# Patient Record
Sex: MALE | Race: WHITE | Employment: OTHER | URBAN - METROPOLITAN AREA
[De-identification: names, ages, dates, MRNs, and addresses within clinical notes are randomized per-mention and may not be internally consistent; named-entity substitution may affect disease eponyms.]

---

## 2017-10-27 ENCOUNTER — HOSPITAL ENCOUNTER (OUTPATIENT)
Dept: PREADMISSION TESTING | Age: 42
Discharge: HOME | End: 2017-10-27
Payer: OTHER GOVERNMENT

## 2017-10-27 VITALS
DIASTOLIC BLOOD PRESSURE: 87 MMHG | SYSTOLIC BLOOD PRESSURE: 146 MMHG | WEIGHT: 204.38 LBS | HEART RATE: 69 BPM | HEIGHT: 73 IN | TEMPERATURE: 97.5 F | BODY MASS INDEX: 27.09 KG/M2

## 2017-10-27 LAB
ABO + RH BLD: NORMAL
BLOOD GROUP ANTIBODIES SERPL: NORMAL
SPECIMEN EXP DATE BLD: NORMAL

## 2017-10-27 PROCEDURE — 86900 BLOOD TYPING SEROLOGIC ABO: CPT | Performed by: ORTHOPAEDIC SURGERY

## 2017-10-27 PROCEDURE — 36415 COLL VENOUS BLD VENIPUNCTURE: CPT | Performed by: ORTHOPAEDIC SURGERY

## 2017-10-27 RX ORDER — TROLAMINE SALICYLATE 10 G/100G
CREAM TOPICAL AS NEEDED
COMMUNITY

## 2017-10-27 RX ORDER — DICLOFENAC SODIUM 10 MG/G
GEL TOPICAL
COMMUNITY

## 2017-10-27 RX ORDER — ATORVASTATIN CALCIUM 20 MG/1
20 TABLET, FILM COATED ORAL
COMMUNITY

## 2017-10-27 RX ORDER — HYDROXYZINE 25 MG/1
25-50 TABLET, FILM COATED ORAL
COMMUNITY

## 2017-10-27 RX ORDER — HYDROCHLOROTHIAZIDE 12.5 MG/1
12.5 CAPSULE ORAL DAILY
COMMUNITY

## 2017-10-27 RX ORDER — LIDOCAINE 50 MG/G
PATCH TOPICAL EVERY 24 HOURS
COMMUNITY

## 2017-10-27 RX ORDER — SILDENAFIL 50 MG/1
50 TABLET, FILM COATED ORAL AS NEEDED
COMMUNITY

## 2017-10-27 RX ORDER — SERTRALINE HYDROCHLORIDE 100 MG/1
100 TABLET, FILM COATED ORAL
COMMUNITY

## 2017-10-28 LAB
BACTERIA SPEC CULT: NORMAL
BACTERIA SPEC CULT: NORMAL
SERVICE CMNT-IMP: NORMAL

## 2017-10-30 NOTE — H&P
Gallo Godwin  Location: [facility] OFFICE  Patient #: [id number]  : 1975   / Language: English / Race: White  Male      History of Present Illness  The patient is a 42year old male who presents with chronic lumbar back pain. Symptoms include pain, stiffness and decreased range of motion. Symptoms are located in the low back, in the left low back and on the left side more than the right. The pain radiates to the left lower leg and left foot. The patient describes the pain as sharp, dull, aching, burning and throbbing (and stabbing). The symptoms occur constantly. The patient describes symptoms as moderate in severity and worsening. Symptoms are exacerbated by weight bearing, back motion, standing, sitting, lifting, bending, straining and supine position. Symptoms are relieved by rest, ice and heat (and elevation). Associated symptoms include foot numbness and leg weakness. Current treatment includes activity modification and nonsteroidal anti-inflammatory drugs. Recently the disease has been worsening. The patient was previously evaluated by a primary physician. Past evaluation has included x-ray of the lumbar spine and MRI of the lumbar spine. Past treatment has included epidural injection (Toradol injection). Allergies  Erythromycins    Penicillins    Benadryl *ANTIHISTAMINES*      Family History  Arthritis   Mother. Cancer   Father, Mother. Cerebrovascular Accident   Mother. Depression   Brother, Mother. Diabetes Mellitus   Father. Heart disease in female family member before age 72    Hypercholesterolemia   Mother. Hypertension   Brother, Father, Mother. Social History   Alcohol use   1-2 drinks per occasion, 3 times, Drinks beer, Drinks hard liquor, Drinks wine, per month, Rarely drinks more than 5 drinks per occasion. Caffeine use   3-4 drinks per day, Coffee, Tea. Current work status   Full-time.   Exercise   1-2 times per week, cycling, Other, running, Swimming, walking. Marital status   . No drug use    Seat Belt Use   Always uses seat belts. Sun Exposure   Frequently. Tobacco / smoke exposure   None. Tobacco use   Former smoker, Smokes . 75 pack of cigarettes per day. Medication History   Ibuprofen  (Oral) Specific strength unknown - Active. Trolamine  Specific strength unknown - Active. Viagra  (Oral) Specific strength unknown - Active. Lidocaine  (External) Specific strength unknown - Active. Voltaren  (1% Gel, Transdermal) Active. Zoloft  (Oral) Specific strength unknown - Active. HydrOXYzine HCl  (Oral) Specific strength unknown - Active. Medications Reconciled     Past Surgical History  Arthroscopy of Shoulder   bilateral  Other Joint Surgery    Rotator Cuff Surgery   bilateral  Shoulder Surgery   bilateral    Diagnostic Studies History  Lumbar Spine X-ray   Date: 10/13/2017, Results:, Within Normal Limits  MRI, Spine   Date: 9/20/2017, Results: Review of the MRI shows he has degeneration at L5-S1. He also has a left-sided disc herniation at L5-S1. It is causing some lateral recess stenosis and impingement of the exiting S1 nerve root. Other Problems  Allergic Urticaria    Anxiety Disorder    Depression    High blood pressure    Hypercholesterolemia    Psychiatric disorder      Review of Systems   General Not Present- Appetite Loss, Chills, Fatigue, Fever, HIV Exposure, Night Sweats, Persistent Infections, Seasonal Allergies, Weight Gain and Weight Loss. Skin Not Present- Itching, Nail Changes, Poor Wound Healing, Rash, Skin Color Changes, Suspicious Lesions and Yellowish Skin Color. HEENT Present- Ringing in the Ears. Not Present- Decreased Hearing, Double Vision, Earache, Hoarseness, Jaundice/Yellow Eyes, Loose Teeth, Nose Bleed and Sore Throat. Respiratory Present- Snoring. Not Present- Bloody sputum, Chronic Cough, Difficulty Breathing, Wakes up from Sleep Wheezing or Short of Breath and Wheezing.   Cardiovascular Not Present- Bluish Discoloration Of Lips Or Nails, Chest Pain, Difficulty Breathing Lying Down, Difficulty Breathing On Exertion, Leg Cramps With Exertion, Palpitations and Swelling of Extremities. Gastrointestinal Not Present- Abdominal Pain, Black, Tarry Stool, Change in Bowel Habits, Cirrhosis, Constipation, Diarrhea, Difficulty Swallowing, Nausea and Vomiting. Male Genitourinary Not Present- Blood in Urine, Frequency, Painful Urination, Pelvic Pain, Trouble Starting Urinary Stream and Urgency. Musculoskeletal Present- Back Pain, Joint Pain, Joint Stiffness and Joint Swelling. Neurological Present- Headaches and Tingling. Not Present- Fainting, Memory Loss, Numbness, Seizures, Tremor, Unsteadiness and Weakness. Psychiatric Present- Anxiety and Depression. Not Present- Bipolar. Endocrine Not Present- Cold Intolerance, Excessive Hunger, Excessive Thirst, Excessive Urination and Heat Intolerance. Hematology Not Present- Abnormal Bruising , Enlarged Lymph Nodes, Excessive bleeding and Skin Discoloration. Vitals  Weight: 210 lb   Height: 79 in   Body Surface Area: 2.33 m²   Body Mass Index: 23.66 kg/m²      Physical Exam  Neurologic  Sensory  Light Touch - Decreased - Left S1. Intact - Globally. Overall Assessment of Muscle Strength and Tone reveals  Lower Extremities - Right Iliopsoas - 5/5. Left Iliopsoas - 5/5. Right Tibialis Anterior - 5/5. Left Tibialis Anterior - 5/5. Right Gastroc-Soleus - 5/5. Left Gastroc-Soleus - 4-/5. Right EHL - 5/5. Left EHL - 5/5. General Assessment of Reflexes  Right Ankle - Clonus is not present. Left Ankle - Clonus is not present. Reflexes (Dermatomes)  2/2 Normal - Left Achilles (L5-S2), Left Knee (L2-4), Right Achilles (L5-S2) and Right Knee (L2-4). Musculoskeletal  Global Assessment  Examination of related systems reveals - well-developed, well-nourished, in no acute distress, alert and oriented x 3. Gait and Station - normal gait and station and normal posture.  Right Lower Extremity - normal strength and tone, normal range of motion without pain and no instability, subluxation or laxity. Left Lower Extremity - normal strength and tone, normal range of motion without pain and no instability, subluxation or laxity. Spine/Ribs/Pelvis  Cervical Spine - Examination of the cervical spine reveals - no tenderness to palpation, no pain, no swelling, edema or erythema, normal cervical spine movements and normal sensation. Thoracic (Dorsal) Spine - Examination of the thoracic spine reveals - no tenderness over thoracic vertebrae, no pain, normal sensation and normal thoracic spine movements. Lumbosacral Spine - Examination of the lumbosacral spine reveals - no known fractures or deformities. Inspection and Palpation - Tenderness - moderate. Assessment of pain reveals the following findings - The pain is characterized as - moderate. Location - pain refers to lower back bilaterally. ROJM - Trunk Extension - 15 degrees. Lumbar Spine Flexion - . Lumbosacral Spine - Functional Testing - Babinski Test negative, Prone Knee Bending Test negative, Slump Test negative, Straight Leg Raising Test negative. Assessment & Plan  HNP (herniated nucleus pulposus), lumbar (722.10  M51.26)  Impression: He has ongoing low back pain and left leg symptoms. Left leg symptoms are significantly worse recently. I think a candidate for limited lumbar laminectomy L5-S1 with discectomy. The risks and benefits were discussed at length with the patient and the patient has elected to proceed. Indications for surgery include failed conservative treatment. Alternative treatments, risks and the perioperative course were discussed with the patient. All questions were answered. The risks and benefits of the procedure were explained. Benefits include definitive diagnosis, relief of pain, elimination of deformity and improved function.  Risks of surgery including bleeding, infection, weakness, numbness, CSF leak, failure to improve symptoms, exacerbation of medical co-morbidities and even death were discussed with the patient. Current Plans  Pt Education - How to access health information online: discussed with patient and provided information. Pt Education - Educational materials were provided.: discussed with patient and provided information. X-RAY EXAM OF LUMBAR SPINE min 4 VIEWS (17649)  Disc degeneration of lumbar region (722.52  M51.36)  Chronic bilateral low back pain with left-sided sciatica (724.2  M54.42)  Treatment options were discussed with the patient in full.(V65.49)  Current Plans  Pt Education - How to access health information online: discussed with patient and provided information. Presurgical planning was preformed with the patient today  Surgery to be scheduled  Discussed the importance of a well-balanced healthy diet and regular exercise.

## 2017-11-07 ENCOUNTER — APPOINTMENT (OUTPATIENT)
Dept: GENERAL RADIOLOGY | Age: 42
End: 2017-11-07
Attending: ORTHOPAEDIC SURGERY
Payer: OTHER GOVERNMENT

## 2017-11-07 ENCOUNTER — ANESTHESIA EVENT (OUTPATIENT)
Dept: SURGERY | Age: 42
End: 2017-11-07
Payer: OTHER GOVERNMENT

## 2017-11-07 ENCOUNTER — HOSPITAL ENCOUNTER (OUTPATIENT)
Age: 42
Setting detail: OBSERVATION
Discharge: HOME | End: 2017-11-08
Attending: ORTHOPAEDIC SURGERY | Admitting: ORTHOPAEDIC SURGERY
Payer: OTHER GOVERNMENT

## 2017-11-07 ENCOUNTER — ANESTHESIA (OUTPATIENT)
Dept: SURGERY | Age: 42
End: 2017-11-07
Payer: OTHER GOVERNMENT

## 2017-11-07 PROBLEM — M48.061 LUMBAR STENOSIS: Status: ACTIVE | Noted: 2017-11-07

## 2017-11-07 LAB
GLUCOSE BLD STRIP.AUTO-MCNC: 100 MG/DL (ref 65–100)
SERVICE CMNT-IMP: NORMAL

## 2017-11-07 PROCEDURE — 74011000272 HC RX REV CODE- 272: Performed by: ORTHOPAEDIC SURGERY

## 2017-11-07 PROCEDURE — 74011000250 HC RX REV CODE- 250: Performed by: ORTHOPAEDIC SURGERY

## 2017-11-07 PROCEDURE — 99218 HC RM OBSERVATION: CPT

## 2017-11-07 PROCEDURE — 74011250636 HC RX REV CODE- 250/636: Performed by: PHYSICIAN ASSISTANT

## 2017-11-07 PROCEDURE — 77030029099 HC BN WAX SSPC -A: Performed by: ORTHOPAEDIC SURGERY

## 2017-11-07 PROCEDURE — 77030019908 HC STETH ESOPH SIMS -A: Performed by: NURSE ANESTHETIST, CERTIFIED REGISTERED

## 2017-11-07 PROCEDURE — 77030012893

## 2017-11-07 PROCEDURE — 77030026438 HC STYL ET INTUB CARD -A: Performed by: NURSE ANESTHETIST, CERTIFIED REGISTERED

## 2017-11-07 PROCEDURE — 77030013079 HC BLNKT BAIR HGGR 3M -A: Performed by: NURSE ANESTHETIST, CERTIFIED REGISTERED

## 2017-11-07 PROCEDURE — 77030034479 HC ADH SKN CLSR PRINEO J&J -B: Performed by: ORTHOPAEDIC SURGERY

## 2017-11-07 PROCEDURE — 76010000161 HC OR TIME 1 TO 1.5 HR INTENSV-TIER 1: Performed by: ORTHOPAEDIC SURGERY

## 2017-11-07 PROCEDURE — 77030008684 HC TU ET CUF COVD -B: Performed by: NURSE ANESTHETIST, CERTIFIED REGISTERED

## 2017-11-07 PROCEDURE — 74011250636 HC RX REV CODE- 250/636

## 2017-11-07 PROCEDURE — 77030032490 HC SLV COMPR SCD KNE COVD -B: Performed by: ORTHOPAEDIC SURGERY

## 2017-11-07 PROCEDURE — 77030020782 HC GWN BAIR PAWS FLX 3M -B

## 2017-11-07 PROCEDURE — 82962 GLUCOSE BLOOD TEST: CPT

## 2017-11-07 PROCEDURE — 74011250636 HC RX REV CODE- 250/636: Performed by: ANESTHESIOLOGY

## 2017-11-07 PROCEDURE — 76000 FLUOROSCOPY <1 HR PHYS/QHP: CPT

## 2017-11-07 PROCEDURE — 74011000250 HC RX REV CODE- 250

## 2017-11-07 PROCEDURE — 76210000016 HC OR PH I REC 1 TO 1.5 HR: Performed by: ORTHOPAEDIC SURGERY

## 2017-11-07 PROCEDURE — 74011250637 HC RX REV CODE- 250/637: Performed by: PHYSICIAN ASSISTANT

## 2017-11-07 PROCEDURE — 76060000033 HC ANESTHESIA 1 TO 1.5 HR: Performed by: ORTHOPAEDIC SURGERY

## 2017-11-07 PROCEDURE — 77030031139 HC SUT VCRL2 J&J -A: Performed by: ORTHOPAEDIC SURGERY

## 2017-11-07 RX ORDER — SODIUM CHLORIDE 0.9 % (FLUSH) 0.9 %
5-10 SYRINGE (ML) INJECTION EVERY 8 HOURS
Status: DISCONTINUED | OUTPATIENT
Start: 2017-11-07 | End: 2017-11-07 | Stop reason: HOSPADM

## 2017-11-07 RX ORDER — SODIUM CHLORIDE 0.9 % (FLUSH) 0.9 %
5-10 SYRINGE (ML) INJECTION AS NEEDED
Status: DISCONTINUED | OUTPATIENT
Start: 2017-11-07 | End: 2017-11-07 | Stop reason: HOSPADM

## 2017-11-07 RX ORDER — NALOXONE HYDROCHLORIDE 0.4 MG/ML
0.4 INJECTION, SOLUTION INTRAMUSCULAR; INTRAVENOUS; SUBCUTANEOUS AS NEEDED
Status: DISCONTINUED | OUTPATIENT
Start: 2017-11-07 | End: 2017-11-08 | Stop reason: HOSPADM

## 2017-11-07 RX ORDER — OXYCODONE HYDROCHLORIDE 5 MG/1
5-10 TABLET ORAL
Qty: 80 TAB | Refills: 0 | Status: SHIPPED | OUTPATIENT
Start: 2017-11-07

## 2017-11-07 RX ORDER — HYDROCHLOROTHIAZIDE 25 MG/1
12.5 TABLET ORAL DAILY
Status: DISCONTINUED | OUTPATIENT
Start: 2017-11-08 | End: 2017-11-08 | Stop reason: HOSPADM

## 2017-11-07 RX ORDER — MIDAZOLAM HYDROCHLORIDE 1 MG/ML
1 INJECTION, SOLUTION INTRAMUSCULAR; INTRAVENOUS AS NEEDED
Status: DISCONTINUED | OUTPATIENT
Start: 2017-11-07 | End: 2017-11-07 | Stop reason: HOSPADM

## 2017-11-07 RX ORDER — CYCLOBENZAPRINE HCL 10 MG
10 TABLET ORAL
Status: DISCONTINUED | OUTPATIENT
Start: 2017-11-07 | End: 2017-11-08 | Stop reason: HOSPADM

## 2017-11-07 RX ORDER — ACETAMINOPHEN 500 MG
1000 TABLET ORAL ONCE
Status: COMPLETED | OUTPATIENT
Start: 2017-11-07 | End: 2017-11-07

## 2017-11-07 RX ORDER — SODIUM CHLORIDE 9 MG/ML
125 INJECTION, SOLUTION INTRAVENOUS CONTINUOUS
Status: DISCONTINUED | OUTPATIENT
Start: 2017-11-07 | End: 2017-11-08 | Stop reason: HOSPADM

## 2017-11-07 RX ORDER — KETOROLAC TROMETHAMINE 30 MG/ML
30 INJECTION, SOLUTION INTRAMUSCULAR; INTRAVENOUS EVERY 6 HOURS
Status: COMPLETED | OUTPATIENT
Start: 2017-11-07 | End: 2017-11-08

## 2017-11-07 RX ORDER — VANCOMYCIN 2 GRAM/500 ML IN 0.9 % SODIUM CHLORIDE INTRAVENOUS
2000 ONCE
Status: COMPLETED | OUTPATIENT
Start: 2017-11-07 | End: 2017-11-07

## 2017-11-07 RX ORDER — LIDOCAINE HYDROCHLORIDE 10 MG/ML
0.1 INJECTION, SOLUTION EPIDURAL; INFILTRATION; INTRACAUDAL; PERINEURAL AS NEEDED
Status: DISCONTINUED | OUTPATIENT
Start: 2017-11-07 | End: 2017-11-07 | Stop reason: HOSPADM

## 2017-11-07 RX ORDER — SODIUM CHLORIDE 0.9 % (FLUSH) 0.9 %
5-10 SYRINGE (ML) INJECTION AS NEEDED
Status: DISCONTINUED | OUTPATIENT
Start: 2017-11-07 | End: 2017-11-08 | Stop reason: HOSPADM

## 2017-11-07 RX ORDER — SODIUM CHLORIDE 0.9 % (FLUSH) 0.9 %
5-10 SYRINGE (ML) INJECTION EVERY 8 HOURS
Status: DISCONTINUED | OUTPATIENT
Start: 2017-11-08 | End: 2017-11-08 | Stop reason: HOSPADM

## 2017-11-07 RX ORDER — PROPOFOL 10 MG/ML
INJECTION, EMULSION INTRAVENOUS AS NEEDED
Status: DISCONTINUED | OUTPATIENT
Start: 2017-11-07 | End: 2017-11-07 | Stop reason: HOSPADM

## 2017-11-07 RX ORDER — FENTANYL CITRATE 50 UG/ML
INJECTION, SOLUTION INTRAMUSCULAR; INTRAVENOUS AS NEEDED
Status: DISCONTINUED | OUTPATIENT
Start: 2017-11-07 | End: 2017-11-07 | Stop reason: HOSPADM

## 2017-11-07 RX ORDER — FENTANYL CITRATE 50 UG/ML
25 INJECTION, SOLUTION INTRAMUSCULAR; INTRAVENOUS
Status: DISCONTINUED | OUTPATIENT
Start: 2017-11-07 | End: 2017-11-07 | Stop reason: HOSPADM

## 2017-11-07 RX ORDER — SODIUM CHLORIDE, SODIUM LACTATE, POTASSIUM CHLORIDE, CALCIUM CHLORIDE 600; 310; 30; 20 MG/100ML; MG/100ML; MG/100ML; MG/100ML
125 INJECTION, SOLUTION INTRAVENOUS CONTINUOUS
Status: DISCONTINUED | OUTPATIENT
Start: 2017-11-07 | End: 2017-11-07 | Stop reason: HOSPADM

## 2017-11-07 RX ORDER — DEXAMETHASONE SODIUM PHOSPHATE 4 MG/ML
INJECTION, SOLUTION INTRA-ARTICULAR; INTRALESIONAL; INTRAMUSCULAR; INTRAVENOUS; SOFT TISSUE AS NEEDED
Status: DISCONTINUED | OUTPATIENT
Start: 2017-11-07 | End: 2017-11-07 | Stop reason: HOSPADM

## 2017-11-07 RX ORDER — FENTANYL CITRATE 50 UG/ML
50 INJECTION, SOLUTION INTRAMUSCULAR; INTRAVENOUS AS NEEDED
Status: DISCONTINUED | OUTPATIENT
Start: 2017-11-07 | End: 2017-11-07 | Stop reason: HOSPADM

## 2017-11-07 RX ORDER — POLYETHYLENE GLYCOL 3350 17 G/17G
17 POWDER, FOR SOLUTION ORAL DAILY
Status: DISCONTINUED | OUTPATIENT
Start: 2017-11-08 | End: 2017-11-08 | Stop reason: HOSPADM

## 2017-11-07 RX ORDER — THERA TABS 400 MCG
1 TAB ORAL DAILY
Status: DISCONTINUED | OUTPATIENT
Start: 2017-11-08 | End: 2017-11-08 | Stop reason: HOSPADM

## 2017-11-07 RX ORDER — AMOXICILLIN 250 MG
1 CAPSULE ORAL 2 TIMES DAILY
Status: DISCONTINUED | OUTPATIENT
Start: 2017-11-07 | End: 2017-11-08 | Stop reason: HOSPADM

## 2017-11-07 RX ORDER — ACETAMINOPHEN 325 MG/1
650 TABLET ORAL EVERY 6 HOURS
Status: DISCONTINUED | OUTPATIENT
Start: 2017-11-07 | End: 2017-11-08 | Stop reason: HOSPADM

## 2017-11-07 RX ORDER — SERTRALINE HYDROCHLORIDE 50 MG/1
100 TABLET, FILM COATED ORAL
Status: DISCONTINUED | OUTPATIENT
Start: 2017-11-07 | End: 2017-11-08 | Stop reason: HOSPADM

## 2017-11-07 RX ORDER — ROCURONIUM BROMIDE 10 MG/ML
INJECTION, SOLUTION INTRAVENOUS AS NEEDED
Status: DISCONTINUED | OUTPATIENT
Start: 2017-11-07 | End: 2017-11-07 | Stop reason: HOSPADM

## 2017-11-07 RX ORDER — ATORVASTATIN CALCIUM 20 MG/1
20 TABLET, FILM COATED ORAL
Status: DISCONTINUED | OUTPATIENT
Start: 2017-11-07 | End: 2017-11-08 | Stop reason: HOSPADM

## 2017-11-07 RX ORDER — DIPHENHYDRAMINE HYDROCHLORIDE 50 MG/ML
12.5 INJECTION, SOLUTION INTRAMUSCULAR; INTRAVENOUS
Status: DISCONTINUED | OUTPATIENT
Start: 2017-11-07 | End: 2017-11-07

## 2017-11-07 RX ORDER — DIPHENHYDRAMINE HYDROCHLORIDE 50 MG/ML
12.5 INJECTION, SOLUTION INTRAMUSCULAR; INTRAVENOUS AS NEEDED
Status: DISCONTINUED | OUTPATIENT
Start: 2017-11-07 | End: 2017-11-07 | Stop reason: HOSPADM

## 2017-11-07 RX ORDER — ONDANSETRON 2 MG/ML
INJECTION INTRAMUSCULAR; INTRAVENOUS AS NEEDED
Status: DISCONTINUED | OUTPATIENT
Start: 2017-11-07 | End: 2017-11-07 | Stop reason: HOSPADM

## 2017-11-07 RX ORDER — OXYCODONE HYDROCHLORIDE 5 MG/1
5 TABLET ORAL
Status: DISCONTINUED | OUTPATIENT
Start: 2017-11-07 | End: 2017-11-08 | Stop reason: HOSPADM

## 2017-11-07 RX ORDER — MIDAZOLAM HYDROCHLORIDE 1 MG/ML
0.5 INJECTION, SOLUTION INTRAMUSCULAR; INTRAVENOUS
Status: DISCONTINUED | OUTPATIENT
Start: 2017-11-07 | End: 2017-11-07 | Stop reason: HOSPADM

## 2017-11-07 RX ORDER — NEOSTIGMINE METHYLSULFATE 1 MG/ML
INJECTION INTRAVENOUS AS NEEDED
Status: DISCONTINUED | OUTPATIENT
Start: 2017-11-07 | End: 2017-11-07 | Stop reason: HOSPADM

## 2017-11-07 RX ORDER — LIDOCAINE HYDROCHLORIDE 20 MG/ML
INJECTION, SOLUTION EPIDURAL; INFILTRATION; INTRACAUDAL; PERINEURAL AS NEEDED
Status: DISCONTINUED | OUTPATIENT
Start: 2017-11-07 | End: 2017-11-07 | Stop reason: HOSPADM

## 2017-11-07 RX ORDER — SODIUM CHLORIDE 9 MG/ML
25 INJECTION, SOLUTION INTRAVENOUS CONTINUOUS
Status: DISCONTINUED | OUTPATIENT
Start: 2017-11-07 | End: 2017-11-07 | Stop reason: HOSPADM

## 2017-11-07 RX ORDER — MORPHINE SULFATE 10 MG/ML
2 INJECTION, SOLUTION INTRAMUSCULAR; INTRAVENOUS
Status: DISCONTINUED | OUTPATIENT
Start: 2017-11-07 | End: 2017-11-07 | Stop reason: HOSPADM

## 2017-11-07 RX ORDER — OXYCODONE HYDROCHLORIDE 5 MG/1
10 TABLET ORAL
Status: DISCONTINUED | OUTPATIENT
Start: 2017-11-07 | End: 2017-11-08 | Stop reason: HOSPADM

## 2017-11-07 RX ORDER — HYDROMORPHONE HYDROCHLORIDE 1 MG/ML
INJECTION, SOLUTION INTRAMUSCULAR; INTRAVENOUS; SUBCUTANEOUS AS NEEDED
Status: DISCONTINUED | OUTPATIENT
Start: 2017-11-07 | End: 2017-11-07 | Stop reason: HOSPADM

## 2017-11-07 RX ORDER — HYDROXYZINE 25 MG/1
25-50 TABLET, FILM COATED ORAL
Status: DISCONTINUED | OUTPATIENT
Start: 2017-11-07 | End: 2017-11-08 | Stop reason: HOSPADM

## 2017-11-07 RX ORDER — OXYCODONE AND ACETAMINOPHEN 5; 325 MG/1; MG/1
1 TABLET ORAL AS NEEDED
Status: DISCONTINUED | OUTPATIENT
Start: 2017-11-07 | End: 2017-11-07 | Stop reason: HOSPADM

## 2017-11-07 RX ORDER — VANCOMYCIN 1.75 GRAM/500 ML IN 0.9 % SODIUM CHLORIDE INTRAVENOUS
1750 ONCE
Status: COMPLETED | OUTPATIENT
Start: 2017-11-08 | End: 2017-11-08

## 2017-11-07 RX ORDER — BUPIVACAINE HYDROCHLORIDE AND EPINEPHRINE 2.5; 5 MG/ML; UG/ML
INJECTION, SOLUTION EPIDURAL; INFILTRATION; INTRACAUDAL; PERINEURAL AS NEEDED
Status: DISCONTINUED | OUTPATIENT
Start: 2017-11-07 | End: 2017-11-07 | Stop reason: HOSPADM

## 2017-11-07 RX ORDER — MORPHINE SULFATE 10 MG/ML
2 INJECTION, SOLUTION INTRAMUSCULAR; INTRAVENOUS
Status: DISCONTINUED | OUTPATIENT
Start: 2017-11-07 | End: 2017-11-08 | Stop reason: HOSPADM

## 2017-11-07 RX ORDER — MIDAZOLAM HYDROCHLORIDE 1 MG/ML
INJECTION, SOLUTION INTRAMUSCULAR; INTRAVENOUS AS NEEDED
Status: DISCONTINUED | OUTPATIENT
Start: 2017-11-07 | End: 2017-11-07 | Stop reason: HOSPADM

## 2017-11-07 RX ORDER — HYDROMORPHONE HYDROCHLORIDE 1 MG/ML
0.2 INJECTION, SOLUTION INTRAMUSCULAR; INTRAVENOUS; SUBCUTANEOUS
Status: DISCONTINUED | OUTPATIENT
Start: 2017-11-07 | End: 2017-11-07 | Stop reason: HOSPADM

## 2017-11-07 RX ORDER — FACIAL-BODY WIPES
10 EACH TOPICAL DAILY PRN
Status: DISCONTINUED | OUTPATIENT
Start: 2017-11-09 | End: 2017-11-08 | Stop reason: HOSPADM

## 2017-11-07 RX ORDER — ONDANSETRON 2 MG/ML
4 INJECTION INTRAMUSCULAR; INTRAVENOUS
Status: DISCONTINUED | OUTPATIENT
Start: 2017-11-07 | End: 2017-11-08 | Stop reason: HOSPADM

## 2017-11-07 RX ORDER — GLYCOPYRROLATE 0.2 MG/ML
INJECTION INTRAMUSCULAR; INTRAVENOUS AS NEEDED
Status: DISCONTINUED | OUTPATIENT
Start: 2017-11-07 | End: 2017-11-07 | Stop reason: HOSPADM

## 2017-11-07 RX ORDER — ONDANSETRON 2 MG/ML
4 INJECTION INTRAMUSCULAR; INTRAVENOUS AS NEEDED
Status: DISCONTINUED | OUTPATIENT
Start: 2017-11-07 | End: 2017-11-07 | Stop reason: HOSPADM

## 2017-11-07 RX ADMIN — KETOROLAC TROMETHAMINE 30 MG: 30 INJECTION, SOLUTION INTRAMUSCULAR at 23:33

## 2017-11-07 RX ADMIN — VANCOMYCIN HYDROCHLORIDE 2000 MG: 10 INJECTION, POWDER, LYOPHILIZED, FOR SOLUTION INTRAVENOUS at 11:40

## 2017-11-07 RX ADMIN — SODIUM CHLORIDE, POTASSIUM CHLORIDE, SODIUM LACTATE AND CALCIUM CHLORIDE: 600; 310; 30; 20 INJECTION, SOLUTION INTRAVENOUS at 13:33

## 2017-11-07 RX ADMIN — SODIUM CHLORIDE, POTASSIUM CHLORIDE, SODIUM LACTATE AND CALCIUM CHLORIDE: 600; 310; 30; 20 INJECTION, SOLUTION INTRAVENOUS at 12:20

## 2017-11-07 RX ADMIN — NEOSTIGMINE METHYLSULFATE 2 MG: 1 INJECTION INTRAVENOUS at 13:31

## 2017-11-07 RX ADMIN — ACETAMINOPHEN 1000 MG: 500 TABLET, FILM COATED ORAL at 11:19

## 2017-11-07 RX ADMIN — HYDROXYZINE HYDROCHLORIDE 25 MG: 25 TABLET ORAL at 21:33

## 2017-11-07 RX ADMIN — SODIUM CHLORIDE 125 ML/HR: 900 INJECTION, SOLUTION INTRAVENOUS at 16:06

## 2017-11-07 RX ADMIN — MIDAZOLAM HYDROCHLORIDE 0.5 MG: 1 INJECTION, SOLUTION INTRAMUSCULAR; INTRAVENOUS at 14:43

## 2017-11-07 RX ADMIN — MIDAZOLAM HYDROCHLORIDE 3 MG: 1 INJECTION, SOLUTION INTRAMUSCULAR; INTRAVENOUS at 12:25

## 2017-11-07 RX ADMIN — VANCOMYCIN HYDROCHLORIDE 1750 MG: 10 INJECTION, POWDER, LYOPHILIZED, FOR SOLUTION INTRAVENOUS at 23:34

## 2017-11-07 RX ADMIN — SERTRALINE HYDROCHLORIDE 100 MG: 50 TABLET ORAL at 21:33

## 2017-11-07 RX ADMIN — FENTANYL CITRATE 50 MCG: 50 INJECTION, SOLUTION INTRAMUSCULAR; INTRAVENOUS at 12:53

## 2017-11-07 RX ADMIN — MIDAZOLAM HYDROCHLORIDE 2 MG: 1 INJECTION, SOLUTION INTRAMUSCULAR; INTRAVENOUS at 12:32

## 2017-11-07 RX ADMIN — ROCURONIUM BROMIDE 40 MG: 10 INJECTION, SOLUTION INTRAVENOUS at 12:34

## 2017-11-07 RX ADMIN — KETOROLAC TROMETHAMINE 30 MG: 30 INJECTION, SOLUTION INTRAMUSCULAR at 18:06

## 2017-11-07 RX ADMIN — GLYCOPYRROLATE 0.4 MG: 0.2 INJECTION INTRAMUSCULAR; INTRAVENOUS at 13:31

## 2017-11-07 RX ADMIN — LIDOCAINE HYDROCHLORIDE 100 MG: 20 INJECTION, SOLUTION EPIDURAL; INFILTRATION; INTRACAUDAL; PERINEURAL at 12:34

## 2017-11-07 RX ADMIN — FENTANYL CITRATE 25 MCG: 50 INJECTION, SOLUTION INTRAMUSCULAR; INTRAVENOUS at 14:22

## 2017-11-07 RX ADMIN — FENTANYL CITRATE 25 MCG: 50 INJECTION, SOLUTION INTRAMUSCULAR; INTRAVENOUS at 14:30

## 2017-11-07 RX ADMIN — PROPOFOL 200 MG: 10 INJECTION, EMULSION INTRAVENOUS at 12:34

## 2017-11-07 RX ADMIN — ATORVASTATIN CALCIUM 20 MG: 20 TABLET, FILM COATED ORAL at 21:33

## 2017-11-07 RX ADMIN — MIDAZOLAM HYDROCHLORIDE 0.5 MG: 1 INJECTION, SOLUTION INTRAMUSCULAR; INTRAVENOUS at 14:48

## 2017-11-07 RX ADMIN — DEXAMETHASONE SODIUM PHOSPHATE 8 MG: 4 INJECTION, SOLUTION INTRA-ARTICULAR; INTRALESIONAL; INTRAMUSCULAR; INTRAVENOUS; SOFT TISSUE at 12:51

## 2017-11-07 RX ADMIN — FENTANYL CITRATE 50 MCG: 50 INJECTION, SOLUTION INTRAMUSCULAR; INTRAVENOUS at 12:56

## 2017-11-07 RX ADMIN — ONDANSETRON 4 MG: 2 INJECTION INTRAMUSCULAR; INTRAVENOUS at 13:28

## 2017-11-07 RX ADMIN — HYDROMORPHONE HYDROCHLORIDE 0.5 MG: 1 INJECTION, SOLUTION INTRAMUSCULAR; INTRAVENOUS; SUBCUTANEOUS at 13:42

## 2017-11-07 RX ADMIN — ACETAMINOPHEN 650 MG: 325 TABLET ORAL at 23:33

## 2017-11-07 RX ADMIN — FENTANYL CITRATE 100 MCG: 50 INJECTION, SOLUTION INTRAMUSCULAR; INTRAVENOUS at 12:34

## 2017-11-07 RX ADMIN — FENTANYL CITRATE 25 MCG: 50 INJECTION, SOLUTION INTRAMUSCULAR; INTRAVENOUS at 14:45

## 2017-11-07 RX ADMIN — HYDROMORPHONE HYDROCHLORIDE 0.5 MG: 1 INJECTION, SOLUTION INTRAMUSCULAR; INTRAVENOUS; SUBCUTANEOUS at 12:43

## 2017-11-07 RX ADMIN — DOCUSATE SODIUM AND SENNOSIDES 1 TABLET: 8.6; 5 TABLET, FILM COATED ORAL at 18:05

## 2017-11-07 RX ADMIN — ACETAMINOPHEN 650 MG: 325 TABLET ORAL at 18:05

## 2017-11-07 NOTE — BRIEF OP NOTE
BRIEF OPERATIVE NOTE    Date of Procedure: 11/7/2017   Preoperative Diagnosis: HNP, DJD  Postoperative Diagnosis: HNP, DJD    Procedure(s):  LIMITED LUMBAR LAMINECTOMY L5-S1 WITH DISCECTOMY  Surgeon(s) and Role:     * Juli Sabillon MD - Primary         Assistant Staff:  Physician Assistant: Jamie Lo PA-C    Surgical Staff:  Circ-1: Harvey Bolanos RN  Circ-Relief: Pamela Shaikh RN  Physician Assistant: Jamie Lo PA-C  Scrub Tech-1: Marga Ruby  Scrub RN-Relief: Yuliya Perez RN  Event Time In   Incision Start 1254   Incision Close 1340     Anesthesia: General   Estimated Blood Loss: see full op note  Specimens: * No specimens in log *   Findings: HNP   Complications: none  Implants: * No implants in log *

## 2017-11-07 NOTE — PERIOP NOTES
1:06 PM  Patient's wife, Maria M Nichols, contacted at this time for a surgical update.    Sandra Smith RN

## 2017-11-07 NOTE — PERIOP NOTES
TRANSFER - OUT REPORT:    Verbal report given to MICHAELLE Mitchell on Ellen Roe  being transferred to [address] [place], [place] [id number] for routine post - op       Report consisted of patients Situation, Background, Assessment and   Recommendations(SBAR). Time Pre op antibiotic given:1140  Anesthesia Stop time: 4610  Nunez Present on Transfer to floor:no  Order for Nunez on Chart:n/a    Information from the following report(s) SBAR, Kardex, OR Summary, Procedure Summary, Intake/Output and MAR was reviewed with the receiving nurse. Opportunity for questions and clarification was provided. Is the patient on 02? YES       L/Min 2       Other     Is the patient on a monitor? NO    Is the nurse transporting with the patient? NO    Surgical Waiting Area notified of patient's transfer from PACU?  YES      The following personal items collected during your admission accompanied patient upon transfer:   Dental Appliance:    Vision:    Hearing Aid:    Jewelry:    Clothing: Clothing: Other (comment) (bag of clothing and eyeglasses returned to pt at bedside)  Other Valuables:    Valuables sent to safe:

## 2017-11-07 NOTE — IP AVS SNAPSHOT
[address] [facility] [address] [place] [address] 
[phone] Patient: Rosa Castorena MRN: [id number] :1975 About your hospitalization You were admitted on:  2017 You last received care in the:  [address] [place] [address] You were discharged on:  2017 Why you were hospitalized Your primary diagnosis was:  Not on File Your diagnoses also included:  Lumbar Stenosis Things You Need To Do (next 8 weeks) Schedule an appointment with Tony Talbot MD as soon as possible for a visit in 2 week(s) Phone:  [phone] Where:  [facility], [address], [address] [place] [address] Follow up with Nhan Chavez MD  
  
Where:  Patient can only remember the practice name and not the physician Discharge Orders None A check zayra indicates which time of day the medication should be taken. My Medications TAKE these medications as instructed Instructions Each Dose to Equal  
 Morning Noon Evening Bedtime  
 atorvastatin 20 mg tablet Commonly known as:  LIPITOR Your last dose was: Your next dose is: Take 20 mg by mouth nightly. 20 mg  
    
   
   
   
  
 diclofenac 1 % Gel Commonly known as:  VOLTAREN Your last dose was: Your next dose is:    
   
   
 Apply  to affected area nightly as needed. hydroCHLOROthiazide 12.5 mg capsule Commonly known as:  Hermelinda Sensor Your last dose was: Your next dose is: Take 12.5 mg by mouth daily. 12.5 mg  
    
   
   
   
  
 hydrOXYzine HCl 25 mg tablet Commonly known as:  ATARAX Your last dose was: Your next dose is: Take 25-50 mg by mouth nightly. 25-50 mg  
    
   
   
   
  
 lidocaine 5 % Commonly known as:  Dufm Fusi Your last dose was: Your next dose is: by TransDERmal route every twenty-four (24) hours. Apply patch to the affected area for 12 hours a day and remove for 12 hours a day. MEN'S MULTIVITAMIN GUMMIES PO Your last dose was: Your next dose is: Take 2 Gum by mouth daily. 2 Gum * oxyCODONE IR 5 mg immediate release tablet Commonly known as:  Dorthea Mccreedy Your last dose was: Your next dose is: Take 1-2 Tabs by mouth every four (4) hours as needed for Pain. Max Daily Amount: 60 mg.  
 5-10 mg  
    
   
   
   
  
 * oxyCODONE IR 5 mg immediate release tablet Commonly known as:  Dorthea Mccreedy Your last dose was: Your next dose is: Take 1-2 Tabs by mouth every four (4) hours as needed. Max Daily Amount: 60 mg.  
 5-10 mg PROBIOTIC 4X 10-15 mg Tbec Generic drug:  B.infantis-B.ani-B.long-B.bifi Your last dose was: Your next dose is: Take 2 Gum by mouth daily. 2 Gum  
    
   
   
   
  
 sertraline 100 mg tablet Commonly known as:  ZOLOFT Your last dose was: Your next dose is: Take 100 mg by mouth nightly. 100 mg  
    
   
   
   
  
 sildenafil citrate 50 mg tablet Commonly known as:  VIAGRA Your last dose was: Your next dose is: Take 50 mg by mouth as needed. 50 mg  
    
   
   
   
  
 trolamine salicylate 10 % topical cream  
Commonly known as:  MYOFLEX Your last dose was: Your next dose is:    
   
   
 Apply  to affected area as needed. * Notice: This list has 2 medication(s) that are the same as other medications prescribed for you. Read the directions carefully, and ask your doctor or other care provider to review them with you. Where to Get Your Medications Information on where to get these meds will be given to you by the nurse or doctor. ! Ask your nurse or doctor about these medications  
  oxyCODONE IR 5 mg immediate release tablet  
 oxyCODONE IR 5 mg immediate release tablet Discharge Instructions [facility] Dr. Isabel Blake [phone] After Hospital Care Plan:  Discharge Instructions Lumbar Laminectomy Surgery Patient Name: Gera Cardenas Date of procedure: 11/7/2017  Date of discharge: 11/8/17 Procedure: Procedure(s): LIMITED LUMBAR LAMINECTOMY L5-S1 WITH DISCECTOMY  PCP: Nhan Chavez MD 
 
Follow up appointments 
-Follow up with Dr. Isabel Blake in 2 weeks. Call [phone] to make an appointment as soon as you get home from the hospital. 
 
[address] [place] [address]: _________________________   phone: ___________________ The agency will contact you to arrange dates/times for visits. Please call them if you do not hear from them within 24 hours after you are discharged Physical therapy 3 times a week for 3 weeks Nursing-initial assessment and as needed When to call your Orthopaedic Surgeon: 
-Signs of infection-if your incision is red; continues to have drainage; drainage has a foul odor or if you have a persistent fever over 101 degrees for 24 hours 
-Nausea or vomiting, severe headache 
-Loss of bowel or bladder function, inability to urinate 
-Changes in sensation in your arms or legs (numbness, tingling, loss of color) -Increased weakness-greater than before your surgery 
-Severe pain or pain not relieved by medications 
-Signs of a blood clot in your leg-calf pain, tenderness, redness, swelling of lower leg When to call your Primary Care Physician: 
-Concerns about medical conditions such as diabetes, high blood pressure, asthma, congestive heart failure 
-Call if blood sugars are elevated, persistent headache or dizziness, coughing or congestion, constipation or diarrhea, burning with urination, abnormal heart rate When to call 911 and go to the nearest emergency room: 
-Acute onset of chest pain, shortness of breath, difficulty breathing Activity - You are going home a well person, be as active as possible. Your only exercise should be walking. Start with short frequent walks and increase your walking distance each day. 
-Limit the amount of time you sit to 20-30 minute intervals. Sitting for prolonged periods of time will be uncomfortable for you following surgery. 
-Do NOT lift anything over 5 pounds 
-Do NOT do any straining, twisting or bending 
-When you are in bed, you may lay on your back or on either side. Do NOT lie on your stomach Brace 
-If you have a back brace, you should wear your brace at all times when you are out of bed. Do not wear the brace while in bed or showering. 
-Remember to always wear a cotton t-shirt underneath your brace. 
-Do not bend or twist when your brace is off Diet 
-Resume usual diet; drink plenty of fluids; eat foods high in fiber 
-It is important to have regular bowel movements. Pain medications may cause constipation. You may want to take a stool softener (such as Senokot-S or Colace) to prevent constipation. 
-If constipation occurs, take a laxative (such as Dulcolax tablets, Milk of Magnesia, or a suppository). Laxatives should only be used if the above preventable measures have failed and you still have not had a bowel movement after three days. Driving 
-You may not drive or return to work until instructed by your physician. However, you may ride in the car for short periods of time. Incision Care Your incision has been closed with absorbable sutures and the Dermabond Prineo skin closure system. This is a combination of a mesh and a liquid adhesive that will assist with healing. The mesh is to remain on your incision for 2 weeks. A dry dressing (ABD and tape) will be placed over it and should be changed daily.  Please make sure to wash your hands prior to touching your dressing. You may take brief showers but do not run the water directly onto the wound. After your shower, blot your incision dry with a soft towel and replace the dry dressing. Do not allow the tape to come in contact with the mesh. Do not rub or apply any lotions or ointments to your incision site. Do not soak or scrub your wound. The mesh dressing will be removed during your two week follow-up appointment. If you experience drainage leaking from underneath the mesh or if it peels off before 2 weeks, please contact your orthopedic surgeons office. Showering 
-You may shower in approximately 4 days after your surgery.   
-Leave the dressing on during your shower. Do NOT allow the water to run directly onto your dressing. Once you get out of the shower, gently pat the dressing dry. -Reminder- your brace can be removed while showering. Remember to not bend or twist while your brace is off.   
-Do not take a tub bath. Preventing blood clots 
-You have been given T.E.D. stockings to wear. Continue to wear these for 7 days after your discharge. Put them on in the morning and take them off at night.   
-They are used to increase your circulation and prevent blood clots from forming in your legs 
-T. E.D. stockings can be machine washed, temperature not to exceed 160° F (71°C) and machine dried for 15 to 20 minutes, temperature not to exceed 250° F (121°C). Pain management 
-Take pain medication as prescribed; decrease the amount you use as your pain lessens 
-Do not wait until you are in extreme pain to take your medication. 
-Avoid alcoholic beverages while taking pain medication Pain Medication Safety DO: 
-Read the Medication Guide  
-Take your medicine exactly as prescribed  
-Store your medicine away from children and in a safe place  
-Flush unused medicine down the toilet -Call your healthcare provider for medical advice about side effects. You may report side effects to FDA at 7-235-FDA-3171.  
-Please be aware that many medications contain Tylenol. We do not want you to over medicate so please read the information below as a guide. Do not take more than 4 Grams of Tylenol in a 24 hour period. (There are 1000 milligrams in one Gram) Percocet contains 325 mg of Tylenol per tablet (do not take more than 12 tablets in 24 hours) Lortab contains 500 mg of Tylenol per tablet (do not take more than 8 tablets in 24 hours) Norco contains 325 mg of Tylenol per tablet (do not take more than 12 tablets in 24 hours). DO NOT: 
-Do not give your medicine to others  
-Do not take medicine unless it was prescribed for you  
-Do not stop taking your medicine without talking to your healthcare provider  
-Do not break, chew, crush, dissolve, or inject your medicine. If you cannot  swallow your medicine whole, talk to your healthcare provider. 
-Do not drink alcohol while taking this medicine 
-Do not take anti-inflammatory medications or aspirin unless instructed by your physician. [employer] Announcement We are excited to announce that we are making your provider's discharge notes available to you in JumpSofthart. You will see these notes when they are completed and signed by the physician that discharged you from your recent hospital stay. If you have any questions or concerns about any information you see in [employer], please call the Health Information Department where you were seen or reach out to your Primary Care Provider for more information about your plan of care. Introducing [facility]! [facility] introduces Rayspan patient portal. Now you can access parts of your medical record, email your doctor's office, and request medication refills online. 1. In your internet browser, go to [email or web address] 2. Click on the First Time User? Click Here link in the Sign In box. You will see the New Member Sign Up page. 3. Enter your Rayspan Access Code exactly as it appears below. You will not need to use this code after youve completed the sign-up process. If you do not sign up before the expiration date, you must request a new code. · Rayspan Access Code: [email or web address] Expires: 1/25/2018  9:59 AM 
 
4. Enter the last four digits of your Social Security Number (xxxx) and Date of Birth (mm/dd/yyyy) as indicated and click Submit. You will be taken to the next sign-up page. 5. Create a Rayspan ID. This will be your Rayspan login ID and cannot be changed, so think of one that is secure and easy to remember. 6. Create a Rayspan password. You can change your password at any time. 7. Enter your Password Reset Question and Answer. This can be used at a later time if you forget your password. 8. Enter your e-mail address. You will receive e-mail notification when new information is available in [address] 9. Click Sign Up. You can now view and download portions of your medical record. 10. Click the Download Summary menu link to download a portable copy of your medical information. If you have questions, please visit the Frequently Asked Questions section of the Rayspan website. Remember, Rayspan is NOT to be used for urgent needs. For medical emergencies, dial 911. Now available from your iPhone and Android! Providers Seen During Your Hospitalization Provider Specialty Primary office phone Aretha Lynch MD Orthopedic Surgery [phone] Your Primary Care Physician (PCP) Primary Care Physician Office Phone Office Fax OTHER, PHYS ** None ** ** None ** You are allergic to the following Allergen Reactions Benadryl (Diphenhydramine Hcl) Hives Erythromycin Hives Penicillins Hives Recent Documentation Height Weight BMI Smoking Status 1.854 m 92.5 kg 26.91 kg/m2 Former Smoker Emergency Contacts Name Discharge Info Relation Home Work Mobile Jeanette Goff 336 CAREGIVER [3] Spouse [3] [phone] Patient Belongings The following personal items are in your possession at time of discharge: 
     Visual Aid: Glasses, With patient             Clothing: Other (comment) (bag of clothing and eyeglasses returned to pt at bedside) Discharge Instructions Attachments/References MEFS - OXYCODONE, RAPID RELEASE (ETH-OXYDOSE, OXY IR, ROXICODONE) - (BY MOUTH) (ENGLISH) Patient Handouts Oxycodone, Rapid Release (ETH-Oxydose, Oxy IR, Roxicodone) - (By mouth) Why this medicine is used:  
Treats moderate to severe pain. This medicine is a narcotic pain reliever. Contact a nurse or doctor right away if you have: 
· Fast or slow heart beat, shallow breathing, blue lips, skin or fingernails · Anxiety, restlessness, fever, sweating, muscle spasms, twitching, seeing or hearing things that are not there · Extreme weakness, shallow breathing, slow heartbeat · Severe confusion, lightheadedness, dizziness, fainting · Sweating or cold, clammy skin, seizures · Severe constipation, stomach pain, nausea, vomiting Common side effects: · Mild constipation · Sleepiness, tiredness © 2017 [facility] Information is for End User's use only and may not be sold, redistributed or otherwise used for commercial purposes. Please provide this summary of care documentation to your next provider.  
  
  
 
  
Signatures-by signing, you are acknowledging that this After Visit Summary has been reviewed with you and you have received a copy. Patient Signature:  ____________________________________________________________ Date:  ____________________________________________________________  
  
Zee Amber Provider Signature:  ____________________________________________________________ Date:  ____________________________________________________________

## 2017-11-07 NOTE — OP NOTES
[address] 63   [address] [facility], [address] [place] [address]   OP NOTE       Name:  Chula Mosqueda   MR#:  [id number]   :  1975   Account #:  [de-identified]    Surgery Date:  2017   Date of Adm:  2017       PREOPERATIVE DIAGNOSIS: Lumbar disk herniation, L5-S1, left   sided, L5-S1 radiculopathy. POSTOPERATIVE DIAGNOSIS: Lumbar disk herniation, L5-S1, left   sided, L5-S1 radiculopathy. PROCEDURES PERFORMED:  Lumbar laminectomy, L5-S1 with   diskectomy. SURGEON: Peace Flores. Fransico Harden MD    ASSISTANT: Rox Ramirez PA-C    ESTIMATED BLOOD LOSS: Minimal.     SPECIMENS REMOVED: None    COMPLICATIONS: None. ANESTHESIA:  General    INDICATIONS: This is a pleasant gentleman with history of ongoing   lower back and left leg symptoms. Left-sided disk protrusion at L5-S1. He had failed conservative treatment, understood the risks and   benefits and elected to proceed. DESCRIPTION OF PROCEDURE: The patient was identified, brought   to the operative suite and underwent general anesthesia without   difficulty. Placed in the prone position on the Parish frame with all bony   prominences well padded. Ancef given to the patient. The back was   prepped and draped sterilely after an appropriate time-out was   performed. An incision was made on the left-hand side approximately at L5-S1. Dissection carried down, exposing the posterior sacrum, posterior L5   segment, at which time we did a laminectomy with undercutting of the   L5 lamina with release of the ligamentum. This was removed in   piecemeal fashion with exposure of the medial dura as well as the   exiting S1 nerve root. Partial laminectomy at the superior portion of S1   for decompression of the S1 nerve root was traced around its   pedicle. We used hemostasis with bipolar cautery, at which time we   then carefully retracted the nerve roots.  The left paracentral disk   protrusion was identified annulotomy performed, and we   did diskectomy for decompression with further decompression of the   exiting S1 nerve root using 3-0 Vicryl. The wounds were irrigated. Interrupted 0 Vicryl in the fascial layer, 2-0   Vicryl subcutaneous layer, and 3-0 Vicryl in the skin.         MD Roberto Ramírez   D:  11/07/2017   13:40   T:  11/07/2017   15:32   Job #:  [id number]

## 2017-11-07 NOTE — ANESTHESIA POSTPROCEDURE EVALUATION
Post-Anesthesia Evaluation and Assessment    Patient: Lyla Barger MRN: [id number]  SSN: [id number]    YOB: 1975  Age: 42 y.o. Sex: male       Cardiovascular Function/Vital Signs  Visit Vitals    BP (!) 151/96    Pulse 83    Temp 36.4 °C (97.6 °F)    Resp 13    Ht 6' 1\" (1.854 m)    Wt 92.5 kg (204 lb)    SpO2 99%    BMI 26.91 kg/m2       Patient is status post general anesthesia for Procedure(s):  LIMITED LUMBAR LAMINECTOMY L5-S1 WITH DISCECTOMY. Nausea/Vomiting: None    Postoperative hydration reviewed and adequate. Pain:  Pain Scale 1: Numeric (0 - 10) (11/07/17 1422)  Pain Intensity 1: 6 (11/07/17 1422)   Managed    Neurological Status:   Neuro (WDL): (P) Exceptions to WDL (11/07/17 1352)   At baseline    Mental Status and Level of Consciousness: Arousable    Pulmonary Status:   O2 Device: (P) Nasal cannula (11/07/17 1400)   Adequate oxygenation and airway patent    Complications related to anesthesia: None    Post-anesthesia assessment completed.  No concerns    Signed By: Evonne Schultz DO     November 7, 2017

## 2017-11-07 NOTE — PERIOP NOTES
1:04 PM  Attempted to contact patient's wife, Eddie Oliva, via surgical waiting area phone and personal cell phone number with no answer at this time.      Bobbi Holland RN

## 2017-11-07 NOTE — PROGRESS NOTES
Problem: Falls - Risk of  Goal: *Absence of Falls  Document Patrizia Fall Risk and appropriate interventions in the flowsheet.   Outcome: Progressing Towards Goal  Fall Risk Interventions:  Mobility Interventions: Patient to call before getting OOB, PT Consult for mobility concerns         Medication Interventions: Patient to call before getting OOB, Teach patient to arise slowly, Utilize gait belt for transfers/ambulation

## 2017-11-07 NOTE — DISCHARGE INSTRUCTIONS
[facility] Dr. Kiah Blanca  [phone]    After [address]:  Discharge Instructions Lumbar Laminectomy Surgery     Patient Name: Shayne Davis    Date of procedure: 11/7/2017  Date of discharge: 11/8/17    Procedure: Procedure(s):  LIMITED LUMBAR LAMINECTOMY L5-S1 WITH DISCECTOMY  PCP: Nhan Chavez MD    Follow up appointments  -Follow up with Dr. Kiah Blanca in 2 weeks. Call [phone] to make an appointment as soon as you get home from the hospital.    [address]: _________________________   phone: ___________________  The agency will contact you to arrange dates/times for visits. Please call them if you do not hear from them within 24 hours after you are discharged  Physical therapy 3 times a week for 3 weeks  Nursing-initial assessment and as needed    When to call your Orthopaedic Surgeon:  -Signs of infection-if your incision is red; continues to have drainage; drainage has a foul odor or if you have a persistent fever over 101 degrees for 24 hours  -Nausea or vomiting, severe headache  -Loss of bowel or bladder function, inability to urinate  -Changes in sensation in your arms or legs (numbness, tingling, loss of color)  -Increased weakness-greater than before your surgery  -Severe pain or pain not relieved by medications  -Signs of a blood clot in your leg-calf pain, tenderness, redness, swelling of lower leg    When to call your Primary Care Physician:  -Concerns about medical conditions such as diabetes, high blood pressure, asthma, congestive heart failure  -Call if blood sugars are elevated, persistent headache or dizziness, coughing or congestion, constipation or diarrhea, burning with urination, abnormal heart rate    When to call 911 and go to the nearest emergency room:  -Acute onset of chest pain, shortness of breath, difficulty breathing    Activity  - You are going home a well person, be as active as possible.   Your only exercise should be walking. Start with short frequent walks and increase your walking distance each day.  -Limit the amount of time you sit to 20-30 minute intervals. Sitting for prolonged periods of time will be uncomfortable for you following surgery.  -Do NOT lift anything over 5 pounds  -Do NOT do any straining, twisting or bending  -When you are in bed, you may lay on your back or on either side. Do NOT lie on your stomach    Brace  -If you have a back brace, you should wear your brace at all times when you are out of bed. Do not wear the brace while in bed or showering.  -Remember to always wear a cotton t-shirt underneath your brace.  -Do not bend or twist when your brace is off    Diet  -Resume usual diet; drink plenty of fluids; eat foods high in fiber  -It is important to have regular bowel movements. Pain medications may cause constipation. You may want to take a stool softener (such as Senokot-S or Colace) to prevent constipation.  -If constipation occurs, take a laxative (such as Dulcolax tablets, Milk of Magnesia, or a suppository). Laxatives should only be used if the above preventable measures have failed and you still have not had a bowel movement after three days. Driving  -You may not drive or return to work until instructed by your physician. However, you may ride in the car for short periods of time. Incision Care  Your incision has been closed with absorbable sutures and the Dermabond Prineo skin closure system. This is a combination of a mesh and a liquid adhesive that will assist with healing. The mesh is to remain on your incision for 2 weeks. A dry dressing (ABD and tape) will be placed over it and should be changed daily. Please make sure to wash your hands prior to touching your dressing. You may take brief showers but do not run the water directly onto the wound. After your shower, blot your incision dry with a soft towel and replace the dry dressing.  Do not allow the tape to come in contact with the mesh. Do not rub or apply any lotions or ointments to your incision site. Do not soak or scrub your wound. The mesh dressing will be removed during your two week follow-up appointment. If you experience drainage leaking from underneath the mesh or if it peels off before 2 weeks, please contact your orthopedic surgeons office. Showering  -You may shower in approximately 4 days after your surgery.    -Leave the dressing on during your shower. Do NOT allow the water to run directly onto your dressing. Once you get out of the shower, gently pat the dressing dry. -Reminder- your brace can be removed while showering. Remember to not bend or twist while your brace is off.    -Do not take a tub bath. Preventing blood clots  -You have been given T.E.D. stockings to wear. Continue to wear these for 7 days after your discharge. Put them on in the morning and take them off at night.    -They are used to increase your circulation and prevent blood clots from forming in your legs  -T. E.D. stockings can be machine washed, temperature not to exceed 160° F (71°C) and machine dried for 15 to 20 minutes, temperature not to exceed 250° F (121°C). Pain management  -Take pain medication as prescribed; decrease the amount you use as your pain lessens  -Do not wait until you are in extreme pain to take your medication.  -Avoid alcoholic beverages while taking pain medication    Pain Medication Safety  DO:  -Read the Medication Guide   -Take your medicine exactly as prescribed   -Store your medicine away from children and in a safe place   -Flush unused medicine down the toilet   -Call your healthcare provider for medical advice about side effects. You may report side effects to FDA at 1-960-FDA-0485.   -Please be aware that many medications contain Tylenol. We do not want you to over medicate so please read the information below as a guide.   Do not take more than 4 Grams of Tylenol in a 24 hour period. (There are 1000 milligrams in one Gram)                                                                                                                                                                                                                           Percocet contains 325 mg of Tylenol per tablet (do not take more than 12 tablets in 24 hours)  Lortab contains 500 mg of Tylenol per tablet (do not take more than 8 tablets in 24 hours)  Norco contains 325 mg of Tylenol per tablet (do not take more than 12 tablets in 24 hours). DO NOT:  -Do not give your medicine to others   -Do not take medicine unless it was prescribed for you   -Do not stop taking your medicine without talking to your healthcare provider   -Do not break, chew, crush, dissolve, or inject your medicine. If you cannot  swallow your medicine whole, talk to your healthcare provider.  -Do not drink alcohol while taking this medicine  -Do not take anti-inflammatory medications or aspirin unless instructed by your physician.

## 2017-11-07 NOTE — PERIOP NOTES
Patient: Gera Cardenas MRN: [id number]  SSN: [id number]   YOB: 1975  Age: 42 y.o. Sex: male     Patient is status post Procedure(s):  LIMITED LUMBAR LAMINECTOMY L5-S1 WITH DISCECTOMY. Surgeon(s) and Role:     Daryl Mahoney MD - Primary    Local/Dose/Irrigation:  20ml 0.25% marcaine with epinephrine                  Peripheral IV 11/07/17 Left (Active)            Airway - Endotracheal Tube 11/07/17 Oral (Active)                   Dressing/Packing:  Wound Back Lower;Mid-DRESSING TYPE: Topical skin adhesive/glue; Other (Comment);4 x 4;Special tape (comment) (dermabond harleeno) (11/07/17 1330)

## 2017-11-07 NOTE — PROGRESS NOTES
Primary Nurse Adamaris Treadwell, MICHAELLE and Amber, RN performed a dual skin assessment on this patient No impairment noted  Dean score is 20

## 2017-11-08 VITALS
WEIGHT: 204 LBS | DIASTOLIC BLOOD PRESSURE: 95 MMHG | TEMPERATURE: 97.6 F | OXYGEN SATURATION: 98 % | SYSTOLIC BLOOD PRESSURE: 148 MMHG | BODY MASS INDEX: 27.04 KG/M2 | HEART RATE: 69 BPM | RESPIRATION RATE: 16 BRPM | HEIGHT: 73 IN

## 2017-11-08 LAB
ANION GAP SERPL CALC-SCNC: 6 MMOL/L (ref 5–15)
BUN SERPL-MCNC: 10 MG/DL (ref 6–20)
BUN/CREAT SERPL: 9 (ref 12–20)
CALCIUM SERPL-MCNC: 8.5 MG/DL (ref 8.5–10.1)
CHLORIDE SERPL-SCNC: 109 MMOL/L (ref 97–108)
CO2 SERPL-SCNC: 25 MMOL/L (ref 21–32)
CREAT SERPL-MCNC: 1.09 MG/DL (ref 0.7–1.3)
GLUCOSE SERPL-MCNC: 145 MG/DL (ref 65–100)
HGB BLD-MCNC: 14.7 G/DL (ref 12.1–17)
POTASSIUM SERPL-SCNC: 4 MMOL/L (ref 3.5–5.1)
SODIUM SERPL-SCNC: 140 MMOL/L (ref 136–145)

## 2017-11-08 PROCEDURE — 80048 BASIC METABOLIC PNL TOTAL CA: CPT | Performed by: PHYSICIAN ASSISTANT

## 2017-11-08 PROCEDURE — 74011250636 HC RX REV CODE- 250/636: Performed by: PHYSICIAN ASSISTANT

## 2017-11-08 PROCEDURE — 85018 HEMOGLOBIN: CPT | Performed by: PHYSICIAN ASSISTANT

## 2017-11-08 PROCEDURE — 99218 HC RM OBSERVATION: CPT

## 2017-11-08 PROCEDURE — 97161 PT EVAL LOW COMPLEX 20 MIN: CPT

## 2017-11-08 PROCEDURE — 97116 GAIT TRAINING THERAPY: CPT

## 2017-11-08 PROCEDURE — 74011250637 HC RX REV CODE- 250/637: Performed by: PHYSICIAN ASSISTANT

## 2017-11-08 PROCEDURE — 36415 COLL VENOUS BLD VENIPUNCTURE: CPT | Performed by: PHYSICIAN ASSISTANT

## 2017-11-08 RX ORDER — OXYCODONE HYDROCHLORIDE 5 MG/1
5-10 TABLET ORAL
Qty: 80 TAB | Refills: 0 | Status: SHIPPED | OUTPATIENT
Start: 2017-11-08

## 2017-11-08 RX ADMIN — HYDROCHLOROTHIAZIDE 12.5 MG: 25 TABLET ORAL at 10:05

## 2017-11-08 RX ADMIN — KETOROLAC TROMETHAMINE 30 MG: 30 INJECTION, SOLUTION INTRAMUSCULAR at 06:31

## 2017-11-08 RX ADMIN — OXYCODONE HYDROCHLORIDE 5 MG: 5 TABLET ORAL at 10:04

## 2017-11-08 RX ADMIN — KETOROLAC TROMETHAMINE 30 MG: 30 INJECTION, SOLUTION INTRAMUSCULAR at 11:56

## 2017-11-08 RX ADMIN — Medication 10 ML: at 06:32

## 2017-11-08 RX ADMIN — Medication 1 CAPSULE: at 10:03

## 2017-11-08 RX ADMIN — ACETAMINOPHEN 650 MG: 325 TABLET ORAL at 06:32

## 2017-11-08 RX ADMIN — THERA TABS 1 TABLET: TAB at 10:04

## 2017-11-08 RX ADMIN — ACETAMINOPHEN 650 MG: 325 TABLET ORAL at 11:56

## 2017-11-08 RX ADMIN — POLYETHYLENE GLYCOL 3350 17 G: 17 POWDER, FOR SOLUTION ORAL at 10:03

## 2017-11-08 RX ADMIN — DOCUSATE SODIUM AND SENNOSIDES 1 TABLET: 8.6; 5 TABLET, FILM COATED ORAL at 10:04

## 2017-11-08 NOTE — PROGRESS NOTES
Orthopedic Spine Progress Note  Post Op day: 1 Day Post-Op    2017 8:52 AM   Admit Date: 2017  Procedure: Procedure(s):  LIMITED LUMBAR LAMINECTOMY L5-S1 WITH DISCECTOMY    Subjective:     Evelin Merino is lying in bed with the head of the bed elevated and appears well. Pain seems to be managed. No leg pain or numbness. Afebrile/VSS. No nausea/vomiting. Voiding. Passing flatus. He ambulated on the unit numerous times yesterday without incident. Pain Control:   Pain Assessment  Pain Scale 1: Numeric (0 - 10)  Pain Intensity 1: 3  Pain Onset 1: post op  Pain Location 1: Hip  Pain Orientation 1: Right  Pain Description 1: Aching  Pain Intervention(s) 1: Medication (see MAR)    Objective:          Physical Exam:  General:  Alert and oriented. No acute distress. Heart:  Respirations unlabored. Abdomen:   Extremities: Soft, non-tender. No evidence of cyanosis. Pulses palpable in both upper and lower extremities. Neurologic:  Musculoskeletal:  No new motor deficits. Neurovascular exam within normal limits. Sensation stable. Motor: unchanged C5-T1 and L2-S1. Enrike's sign negative in bilateral lower extremities. Calves soft, nontender upon palpation and with passive twitch. Moves both upper and lower extremities. Incision: clean, dry, and intact. No significant erythema or swelling. No active drainage noted. Vital Signs:    Blood pressure (!) 158/93, pulse 75, temperature 97.6 °F (36.4 °C), resp. rate 16, height 6' 1\" (1.854 m), weight 92.5 kg (204 lb), SpO2 98 %.   Temp (24hrs), Av.8 °F (36.6 °C), Min:97.6 °F (36.4 °C), Max:98.5 °F (36.9 °C)      LAB:    Recent Labs      17   0424   HGB  14.7     Lab Results   Component Value Date/Time    Sodium 140 2017 04:24 AM    Potassium 4.0 2017 04:24 AM    Chloride 109 2017 04:24 AM    CO2 25 2017 04:24 AM    Glucose 145 2017 04:24 AM    BUN 10 2017 04:24 AM    Creatinine 1.09 2017 04:24 AM    Calcium 8.5 11/08/2017 04:24 AM       Intake/Output:11/08 0701 - 11/08 1900  In: -   Out: 500 [Urine:500]  11/06 1901 - 11/08 0700  In: 2120 [P.O.:720; I.V.:1400]  Out: 2270 [Urine:2250]                Assessment:   Patient is 1 Day Post-Op s/p Procedure(s):  LIMITED LUMBAR LAMINECTOMY L5-S1 WITH DISCECTOMY    Plan:     1. PT/OT  2.  D/C PCA. Pain medications as needed  3. VTE Prophylaxes - TEDS & SCDs   4. Encourage use of ICS  5.   Discharge to home today pending no changes    Signed By: Sulma Khan PA-C

## 2017-11-08 NOTE — PROGRESS NOTES
physical Therapy EVALUATION/DISCHARGE  Patient: Art Lizama (80 y.o. male)  Date: 11/8/2017  Primary Diagnosis: HNP, DJD  Lumbar stenosis  Procedure(s) (LRB):  LIMITED LUMBAR LAMINECTOMY L5-S1 WITH DISCECTOMY (N/A) 1 Day Post-Op   Precautions:   Back (brace OOB)  ASSESSMENT :  Based on the objective data described below, the patient presents with good strength, balance, and mobility following admission for lumbar laminectomy POD 1. Patient is currently mod I to independent for bed mobility and transfers without AD. Ambulated 700 feet around unit without AD independently. Patient also navigated a flight of stairs with L railing mod I, step over step technique. Able to state 3/3 back precautions and good adherence throughout session. He is cleared for discharge from PT standpoint, RN aware. Recommend OP PT at discharge. Patient is cleared for discharge from PT standpoint:  YES [x]     NO []       Skilled physical therapy is not indicated at this time. PLAN :  Discharge Recommendations: Outpatient  Further Equipment Recommendations for Discharge: None     SUBJECTIVE:   Patient stated I have an OP PT that I like.     OBJECTIVE DATA SUMMARY:   HISTORY:    Past Medical History:   Diagnosis Date    Anxiety     Arthritis     BACK, SHOULDERS, KNEES    Depression     GERD (gastroesophageal reflux disease)     Hypertension     PTSD (post-traumatic stress disorder)     Tinnitus, bilateral      Past Surgical History:   Procedure Laterality Date    HX ORTHOPAEDIC Right     ANKLE RECONSTRUCTION LIGAMENTS, SCEWS, PLATES    HX ORTHOPAEDIC Left     KNEE CAP DISLOCATION    HX ORTHOPAEDIC Left     HAND ULNAR NERVE SEVERED    HX ROTATOR CUFF REPAIR Bilateral 2/2017, 7/2017    + BONE SPUR REMOVAL     Prior Level of Function/Home Situation: independent and working  Personal factors and/or comorbidities impacting plan of care:     Home Situation  Home Environment: Private residence  # Steps to Enter: [address] [place] to Enter: Yes  Hand Rails : Bilateral  One/Two Story Residence: Two story  # of Interior Steps: 25  Interior Rails: Both  Living Alone: No  Support Systems: Family member(s)  Patient Expects to be Discharged to[de-identified] Private residence  Current DME Used/Available at Home: None    EXAMINATION/PRESENTATION/DECISION MAKING:   Critical Behavior:  Neurologic State: Alert  Orientation Level: Oriented X4  Cognition: Appropriate for age attention/concentration, Appropriate safety awareness, Follows commands     Hearing: Auditory  Auditory Impairment: None  Skin:    Edema:   Range Of Motion:  AROM: Within functional limits           PROM: Within functional limits           Strength:    Strength: Within functional limits                    Tone & Sensation:                                  Coordination:     Vision:      Functional Mobility:  Bed Mobility:  Rolling: Modified independent  Supine to Sit: Modified independent        Transfers:  Sit to Stand: Independent  Stand to Sit: Independent  Stand Pivot Transfers: Independent                    Balance:   Sitting: Intact  Standing: Intact  Ambulation/Gait Training:  Distance (ft): 700 Feet (ft)  Assistive Device: Brace/Splint  Ambulation - Level of Assistance: Independent                                                    Stairs:  Number of Stairs Trained: 12  Stairs - Level of Assistance: Modified independent   Rail Use: Left      Therapeutic Exercises:       Functional Measure:  Timed up and go:    Timed Get Up And Go Test: 10     Timed Up and Go and G-code impairment scale:  Percentage of Impairment CH    0%   CI    1-19% CJ    20-39% CK    40-59% CL    60-79% CM    80-99% CN     100%   Timed   Score 0-56 10 11-12 13-14 15-16 17-18 19 20       < than 10 seconds=Normal  Greater then 13.5 seconds (in elderly)=Increased fall risk   Maria Elena Olea [place] M.  Predicting the probability for falls in community dwelling older adults using the Timed Up and Go Test. Phys Ther. [id number]. Pain:  Pain Scale 1: Numeric (0 - 10)  Pain Intensity 1: 3  Pain Location 1: Hip  Activity Tolerance:   Good, VSS  Please refer to the flowsheet for vital signs taken during this treatment. After treatment:   [x]   Patient left in no apparent distress sitting up in chair  []   Patient left in no apparent distress in bed  [x]   Call bell left within reach  []   Nursing notified  []   Caregiver present  []   Bed alarm activated    COMMUNICATION/EDUCATION:   Communication/Collaboration:  [x]   Fall prevention education was provided and the patient/caregiver indicated understanding. [x]   Patient/family have participated as able and agree with findings and recommendations. []   Patient is unable to participate in plan of care at this time.   Findings and recommendations were discussed with: Registered Nurse    Thank you for this referral.  Marisol Saunders, PT   Time Calculation: 18 mins

## 2017-11-08 NOTE — ROUTINE PROCESS
Discharge instructions reviewed with patient and wife. Opportunities for questions provided. Dressing change demonstrated and reviewed via teach back method. Patient discharged with all personal belongings, wound care supplies and RXs.

## 2017-11-08 NOTE — DISCHARGE SUMMARY
[address] [place], [place] [address] [address]  [phone]     Discharge Summary       PATIENT ID: Lyla Barger  MRN: [id number]   YOB: 1975    DATE OF ADMISSION: 11/7/2017 10:04 AM    DATE OF DISCHARGE: 11/8/2017   PRIMARY CARE PROVIDER: Nhan Chavez MD     CONSULTATIONS: None    PROCEDURES/SURGERIES: Procedure(s):  LIMITED LUMBAR LAMINECTOMY L5-S1 WITH DISCECTOMY    History of Present Illness:  Lyla Barger is a 42 y.o. male with history of back and left leg pain, unresponsive to conservative treatment. He was found to have a disc extrusion on the left side at L5-S1. After a discussion of the risks, benefits, alternatives, perioperative course, and potential complications of surgery, he consented to undergo a Procedure(s):  LIMITED LUMBAR LAMINECTOMY L5-S1 WITH DISCECTOMY. Hospital Course:  Llya Barger tolerated the procedure well. He was transferred  to the recovery room in stable condition. After a brief stay the patient was then transferred to the Spinal Surgery Unit at [address] [place] [address].  On postoperative day #1, the dressing was clean and dry, he was neurovascularly intact. The patient was afebrile and vital signs were stable. Calves were soft and non-tender bilaterally. The patient was tolerating a regular diet, voiding, and making great progress with physical therapy. Hemoglobin prior to discharge were   Lab Results   Component Value Date/Time    HGB 14.7 11/08/2017 04:24 AM        Lyla Barger made satisfactory progress with physical therapy and was discharged to Home in stable condition on postoperative day 1.   He was provided with routine postoperative instructions and advised to follow up with Brady Plata MD in 2 weeks following discharge from the hospital.      FOLLOW UP APPOINTMENTS:    Follow-up Information     Follow up With Details Comments [address] Cynthia Reaves MD Schedule an appointment as soon as possible for a visit in 2 weeks  [place] [address]  [address] [place] [address]  [phone]      Nhan Chavez, MD   Patient can only remember the practice name and not the physician             ADDITIONAL CARE RECOMMENDATIONS:     When to call your Orthopaedic Surgeon:  -Signs of infection-if your incision is red; continues to have drainage; drainage has a foul odor or if you have a persistent fever over 101 degrees for 24 hours  -Nausea or vomiting, severe headache  -Loss of bowel or bladder function, inability to urinate  -Changes in sensation in your arms or legs (numbness, tingling, loss of color)  -Increased weakness-greater than before your surgery  -Severe pain or pain not relieved by medications  -Signs of a blood clot in your leg-calf pain, tenderness, redness, swelling of lower leg    When to call your Primary Care Physician:  -Concerns about medical conditions such as diabetes, high blood pressure, asthma, congestive heart failure  -Call if blood sugars are elevated, persistent headache or dizziness, coughing or congestion, constipation or diarrhea, burning with urination, abnormal heart rate    When to call 911 and go to the nearest emergency room:  -Acute onset of chest pain, shortness of breath, difficulty breathing    Activity  - You are going home a well person, be as active as possible. Your only exercise should be walking. Start with short frequent walks and increase your walking distance each day.  -Limit the amount of time you sit to 20-30 minute intervals. Sitting for prolonged periods of time will be uncomfortable for you following surgery.  -Do NOT lift anything over 5 pounds  -Do NOT do any straining, twisting or bending  -When you are in bed, you may lay on your back or on either side. Do NOT lie on your stomach    Brace  -If you have a back brace, you should wear your brace at all times when you are out of bed.   Do not wear the brace while in bed or showering.  -Remember to always wear a cotton t-shirt underneath your brace.  -Do not bend or twist when your brace is off    Diet  -Resume usual diet; drink plenty of fluids; eat foods high in fiber  -It is important to have regular bowel movements. Pain medications may cause constipation. You may want to take a stool softener (such as Senokot-S or Colace) to prevent constipation.  -If constipation occurs, take a laxative (such as Dulcolax tablets, Milk of Magnesia, or a suppository). Laxatives should only be used if the above preventable measures have failed and you still have not had a bowel movement after three days. Driving  -You may not drive or return to work until instructed by your physician. However, you may ride in the car for short periods of time. Incision Care  Your incision has been closed with absorbable sutures and the Dermabond Prineo skin closure system. This is a combination of a mesh and a liquid adhesive that will assist with healing. The mesh is to remain on your incision for 2 weeks. A dry dressing (ABD and tape) will be placed over it and should be changed daily. Please make sure to wash your hands prior to touching your dressing. You may take brief showers but do not run the water directly onto the wound. After your shower, blot your incision dry with a soft towel and replace the dry dressing. Do not allow the tape to come in contact with the mesh. Do not rub or apply any lotions or ointments to your incision site. Do not soak or scrub your wound. The mesh dressing will be removed during your two week follow-up appointment. If you experience drainage leaking from underneath the mesh or if it peels off before 2 weeks, please contact your orthopedic surgeons office. Showering  -You may shower in approximately 4 days after your surgery.    -Leave the dressing on during your shower. Do NOT allow the water to run directly onto your dressing.   Once you get out of the shower, gently pat the dressing dry. -Reminder- your brace can be removed while showering. Remember to not bend or twist while your brace is off.    -Do not take a tub bath. Preventing blood clots  -You have been given T.E.D. stockings to wear. Continue to wear these for 7 days after your discharge. Put them on in the morning and take them off at night.    -They are used to increase your circulation and prevent blood clots from forming in your legs  -T. E.D. stockings can be machine washed, temperature not to exceed 160° F (71°C) and machine dried for 15 to 20 minutes, temperature not to exceed 250° F (121°C). Pain management  -Take pain medication as prescribed; decrease the amount you use as your pain lessens  -Do not wait until you are in extreme pain to take your medication.  -Avoid alcoholic beverages while taking pain medication    Pain Medication Safety  DO:  -Read the Medication Guide   -Take your medicine exactly as prescribed   -Store your medicine away from children and in a safe place   -Flush unused medicine down the toilet   -Call your healthcare provider for medical advice about side effects. You may report side effects to FDA at 0-418-FDA-6579.   -Please be aware that many medications contain Tylenol. We do not want you to over medicate so please read the information below as a guide. Do not take more than 4 Grams of Tylenol in a 24 hour period.   (There are 1000 milligrams in one Gram)                                                                                                                                                                                                                           Percocet contains 325 mg of Tylenol per tablet (do not take more than 12 tablets in 24 hours)  Lortab contains 500 mg of Tylenol per tablet (do not take more than 8 tablets in 24 hours)  Norco contains 325 mg of Tylenol per tablet (do not take more than 12 tablets in 24 hours). DO NOT:  -Do not give your medicine to others   -Do not take medicine unless it was prescribed for you   -Do not stop taking your medicine without talking to your healthcare provider   -Do not break, chew, crush, dissolve, or inject your medicine. If you cannot  swallow your medicine whole, talk to your healthcare provider.  -Do not drink alcohol while taking this medicine  -Do not take anti-inflammatory medications or aspirin unless instructed by your physician. DISCHARGE MEDICATIONS:  Current Discharge Medication List      START taking these medications    Details   !! oxyCODONE IR (ROXICODONE) 5 mg immediate release tablet Take 1-2 Tabs by mouth every four (4) hours as needed. Max Daily Amount: 60 mg.  Qty: 80 Tab, Refills: 0      !! oxyCODONE IR (ROXICODONE) 5 mg immediate release tablet Take 1-2 Tabs by mouth every four (4) hours as needed for Pain. Max Daily Amount: 60 mg.  Qty: 80 Tab, Refills: 0       !! - Potential duplicate medications found. Please discuss with provider. CONTINUE these medications which have NOT CHANGED    Details   atorvastatin (LIPITOR) 20 mg tablet Take 20 mg by mouth nightly. diclofenac (VOLTAREN) 1 % gel Apply  to affected area nightly as needed. hydroCHLOROthiazide (MICROZIDE) 12.5 mg capsule Take 12.5 mg by mouth daily. hydrOXYzine HCl (ATARAX) 25 mg tablet Take 25-50 mg by mouth nightly. sertraline (ZOLOFT) 100 mg tablet Take 100 mg by mouth nightly. B.infantis-B.ani-B.long-B.bifi (PROBIOTIC 4X) 10-15 mg TbEC Take 2 Gum by mouth daily. MULTIVIT-MINERALS/FOLIC ACID (MEN'S MULTIVITAMIN GUMMIES PO) Take 2 Gum by mouth daily. lidocaine (LIDODERM) 5 % by TransDERmal route every twenty-four (24) hours. Apply patch to the affected area for 12 hours a day and remove for 12 hours a day. sildenafil citrate (VIAGRA) 50 mg tablet Take 50 mg by mouth as needed.       trolamine salicylate (MYOFLEX) 10 % topical cream Apply  to affected area as needed. PHYSICAL EXAMINATION AT DISCHARGE:  General: Pleasant, alert, cooperative, no distress. EENT: EOMI. Anicteric sclerae. Oral mucous moist, oropharynx benign. Resp: CTA bilaterally. No wheezing/rhonchi/rales. No accessory muscle use. CV: Regular rhythm, normal rate, no murmurs, gallops, rubs. No cyanosis or clubbing. No edema appreciated in the extremities. Gastrointestinal:  Soft, non-tender, non-distended. normoactive bowel sounds, no hepatosplenomegaly  Neurological: Follows commands. CRAWLEY. Speech clear. Sensation intact to light touch. Motor: unchanged C5-T1 and L2-S1. Musculoskeletal:  Calves soft, supple, non-tender upon palpation or with passive stretch. Psych: Good insight. Not anxious nor agitated. Skin: Good turgor. No rashes or lesions. Incision - clean, dry and intact. No significant erythema or swelling.       CHRONIC MEDICAL DIAGNOSES:  Problem List as of 11/8/2017  Date Reviewed: 11/7/2017          Codes Class Noted - Resolved    Lumbar stenosis ICD-10-CM: M48.061  ICD-9-CM: 724.02  11/7/2017 - Present              Signed:   Kang Robertson NP  11/8/2017  11:05 AM

## (undated) DEVICE — STERILE POLYISOPRENE POWDER-FREE SURGICAL GLOVES WITH EMOLLIENT COATING: Brand: PROTEXIS

## (undated) DEVICE — SUTURE VCRL 2-0 L27IN ABSRB UD CP-2 L26MM 1/2 CIR REV CUT J869H

## (undated) DEVICE — INFECTION CONTROL KIT SYS

## (undated) DEVICE — BONE WAX WHITE: Brand: BONE WAX WHITE

## (undated) DEVICE — SOLUTION IV 250ML 0.9% SOD CHL CLR INJ FLX BG CONT PRT CLSR

## (undated) DEVICE — GAUZE SPONGES,12 PLY: Brand: CURITY

## (undated) DEVICE — KENDALL SCD EXPRESS SLEEVES, KNEE LENGTH, MEDIUM: Brand: KENDALL SCD

## (undated) DEVICE — SUTURE VCRL SZ 3-0 L27IN ABSRB UD CP-2 L26MM 1/2 CIR REV J868H

## (undated) DEVICE — PREP SKN PREVAIL 40ML APPL --

## (undated) DEVICE — PILLOW POS AD L7IN R FOAM HD REST INTUB SLOT DISP

## (undated) DEVICE — COVER,MAYO STAND,STERILE: Brand: MEDLINE

## (undated) DEVICE — LAMINECTOMY RICHMOND-LF: Brand: MEDLINE INDUSTRIES, INC.

## (undated) DEVICE — DRAPE XR C ARM 41X74IN LF --

## (undated) DEVICE — PENCIL ES L10FT COAT BLDE HOLSTER

## (undated) DEVICE — SUTURE ABSRB BRAID COAT UD OS-6 NO 1 27IN VCRL J535H

## (undated) DEVICE — PREP KIT PEEL PTCH POVIDONE IOD

## (undated) DEVICE — CODMAN® INTEGRATED BIPOLAR CORD AND TUBING SET FLYING LEADS, ROTARY PUMP: Brand: CODMAN®

## (undated) DEVICE — SYSTEM SKIN CLSR 22CM DERMBND PRINEO

## (undated) DEVICE — 4-PORT MANIFOLD: Brand: NEPTUNE 2